# Patient Record
(demographics unavailable — no encounter records)

---

## 2024-11-06 NOTE — HISTORY OF PRESENT ILLNESS
[Chills] : no chills [Constipation] : no constipation [Diarrhea] : no diarrhea [Dysuria] : no dysuria [Fever] : no fever [Nausea] : no nausea [Vomiting] : no vomiting [Erythema] : not erythematous [Discharge] : absent of discharge [Dehiscence] : not dehisced [de-identified] : Patient presents today for F/U S/P Right TKR done 3 weeks ago. Patient is doing well and undergoing P.T. with improvement. Takes pain meds and DVT prophylaxis. I went over post-op care and answered all questions. I also provided patient with surgical report for their records. [de-identified] : ROM 0-90 degrees [de-identified] : Continue P.T. pain management and DVT prophylaxis. F/U in 1 month with x-rays.

## 2024-11-06 NOTE — PROCEDURE
[de-identified] : Observation on incision dry, clean, intact, well healed. Method staple removing kit. Suture site Cleaned with iodine swab after sutures are completely removed. Instructions Keep incision dry and clean, allowed to shower and pat site dry, do not rub dry, contact office is site becomes red, swollen, infected, or you develop a fever.

## 2024-12-04 NOTE — PHYSICAL EXAM
[de-identified] : General appearance: well-nourished and hydrated, pleasant, alert and oriented x 3, cooperative. HEENT: Normocephalic, EOM intact, Nasal septum midline, Oral cavity clear, External auditory canal clear. Cardiovascular: no apparent abnormalities, no lower leg edema, no varicosities, pedal pulses are palpable. Lymphatics Lymph nodes: none palpated, Lymphedema: not present. Neurologic: sensation is normal, no muscle weakness in upper or lower extremities, patella tendon reflexes intact. Dermatologic no apparent skin lesions, moist, warm, no rash. Spine: cervical spine appears normal and moves freely, thoracic spine appears normal and moves freely, lumbosacral spine appears normal and moves freely. Gait: nonantalgic.   Right Knee Inspection: mild soft tissue swelling  Wounds: healed midline incision  Alignment: normal. Palpation: no specific tenderness on palpation. ROM: Active (in degrees): 0-115 Ligamentous laxity (neg): negative ant. drawer test, negative post. drawer test, stable to varus stress test, stable to valgus stress test, Patellofemoral Alignment Test: Q angle-, normal. Muscle Test: good quad strength. Leg examination: calf is soft and non-tender.  [de-identified] : Right knee x-ray, AP, lateral, merchant view taken at the office today demonstrates a total knee replacement in satisfactory position and alignment. No evidence of loosening. The patella sits in a central position.  Left knee xray merchant view taken at the office today demonstrates good joint space and a well centered patella.

## 2024-12-04 NOTE — DISCUSSION/SUMMARY
[de-identified] : Patient is doing very well and making progress following their s/p Right TKR. I reviewed x-rays with them and compared to prior films. I have reassured them that their implants are functioning well. All questions answered, understanding verbalized.   He is encouraged to continue to stay active with a PT and home exercise program.   I will see them back in 6-8 weeks.

## 2024-12-04 NOTE — ADDENDUM
[FreeTextEntry1] : This note was written by Juany Schmid on 12/04/2024 acting as scribe for Dr. Yosi Rodriguez M.D.   I, Dr. Yosi Rodriguez, have read and attest that all the information, medical decision making and discharge instructions within are true and accurate.

## 2024-12-04 NOTE — HISTORY OF PRESENT ILLNESS
[de-identified] : ANA GUEVARA 69 year old male presents for evaluation of right TKR done 10/15/24. He reports doing well and continues PT 2x a week. He is not taking pain medications and ices the knee daily. He wants to return to the gym and walks with a cane outside the house.

## 2025-02-12 NOTE — PROCEDURE
[de-identified] : LEFT KNEE CORTISONE INJECTION Discussed at length with the patient the planned steroid and lidocaine injection. The risks, benefits, convalescence and alternatives were reviewed. The possible side effects discussed included but were not limited to: pain, swelling, heat and redness. These symptoms are generally mild but if they are extensive then contact the office. Giving pain relievers by mouth such as NSAIDs or Tylenol can generally treat the reactions to steroid and lidocaine. Rare cases of infection have been noted. Rash, hives and itching may occur post injection. If you have muscle pain or cramps, flushing and or swelling of the face, rapid heart beat, nausea, dizziness, fever, chills, headache, difficulty breathing, swelling in the arms or legs, or have a prickly feeling of your skin, contact a health care provider immediately.   Following this discussion, the knee was prepped with betadine and under sterile conditions 5 cc of 2% lidocaine and 1 cc depo-medrol (40mg) were injected with a 21 gauge needle. The needle was introduced into the joint, aspiration was performed to ensure intra-articular placement and the medication was injected. Upon withdrawal of the needle the site was cleaned with alcohol and a bandaid applied. The patient tolerated the injection well and there were no adverse effects. Post injection instructions included no strenuous activity for 24 hours, cryotherapy and if there are any adverse effects to contact the office.

## 2025-02-12 NOTE — REASON FOR VISIT
[Total Knee Replacement Surgery, Aftercare] : total knee replacement surgery, aftercare [Follow-Up Visit] : a follow-up visit for [Osteoarthritis, Knee] : osteoarthritis of the knee

## 2025-02-12 NOTE — HISTORY OF PRESENT ILLNESS
[de-identified] : ANA GUEVARA 69 year old male presents for evaluation of 4 months s/p right TKR. He doesn't feel 100% as he continues to have difficulty with stairs and tightness with flexion along the posterior aspect. He is able to walk 2 miles a day and does his own home exercises. He is traveling in May and would like a medical alert card. Regarding his left knee OA, he is having acute pain and would like cortisone injection.

## 2025-02-12 NOTE — DISCUSSION/SUMMARY
[de-identified] : Patient is doing well following their s/p Right TKR. I reviewed x-rays with them and compared to prior films. I have reassured them that their implants are functioning well. All questions answered, understanding verbalized. KOOS Jr scores obtained and reviewed. He is encouraged to continue to stay active with a home exercise program and activities as tolerated.  Regarding their left knee symptoms appear secondary to degenerative arthritis. We reviewed operative and nonoperative treatment. He knows he will eventually need a TKR, but he wants to avoid surgery at this time. Therefore, we will continue nonoperatively. In the interim, for the acute pain patient was given a left knee cortisone injection today as detailed above for symptomatic relief. I also suggested he continue with his home exercises.  They can continue activities as tolerated.   I will see them back in 3 months with x-rays of both knees, at that time we will schedule for a left TKR.

## 2025-02-12 NOTE — ADDENDUM
[FreeTextEntry1] : This note was written by Juany Schmid on 02/12/2025 acting as scribe for Dr. Yosi MASTERSON I, Dr. Yosi Rodriguez, have read and attest that all the information, medical decision making and discharge instructions within are true and accurate.   This note was written by José Miguel Carbajal on 02/12/2025 acting as scribe for Dr. Yosi MASTERSON I, Dr. Yosi Rodriguez, have read and attest that all the information, medical decision making and discharge instructions within are true and accurate.
English

## 2025-02-12 NOTE — PHYSICAL EXAM
[de-identified] : General appearance: well-nourished and hydrated, pleasant, alert and oriented x 3, cooperative. HEENT: Normocephalic, EOM intact, Nasal septum midline, Oral cavity clear, External auditory canal clear. Cardiovascular: no apparent abnormalities, no lower leg edema, no varicosities, pedal pulses are palpable. Lymphatics Lymph nodes: none palpated, Lymphedema: not present. Neurologic: sensation is normal, no muscle weakness in upper or lower extremities, patella tendon reflexes intact. Dermatologic no apparent skin lesions, moist, warm, no rash. Spine: cervical spine appears normal and moves freely, thoracic spine appears normal and moves freely, lumbosacral spine appears normal and moves freely. Gait: nonantalgic.  Right Knee Inspection: no effusion or erythema. Wounds: healed midline incision  Alignment: normal. Palpation: no specific tenderness on palpation. ROM: Active (in degrees): 0-115 with no instability. Ligamentous laxity (neg): negative ant. drawer test, negative post. drawer test, stable to varus stress test, stable to valgus stress test, Patellofemoral Alignment Test: Q angle-, normal. Muscle Test: good quad strength. Leg examination: calf is soft and non-tender.  Left Knee Inspection: trace effusion Wounds: none. Alignment: normal. Palpation: medial tenderness on palpation. ROM: Active (in degrees):0-125 with crepitus and discomfort Ligamentous laxity (neg): all ligaments appear stable, negative ant. drawer test, negative post. drawer test, stable to varus stress test, stable to valgus stress test, negative Lachman's test, negative pivot shift test, Meniscal Test: negative McMurrays, negative Vy. Patellofemoral Alignment Test: Q angle-, normal. Muscle Test: good quad strength. Leg examination: calf is soft and non-tender. [de-identified] : Right knee x-ray, AP, lateral, merchant view taken at the office today demonstrates a total knee replacement in satisfactory position and alignment. No evidence of loosening. The patella sits in a central position.  Left knee x-rays, standing AP/Lateral and Merchant films, and 45-degree PA standing view, taken at the office today shows diffuse tricompartmental degenerative arthritis, normal alignment, marginal osteophytes, medial bone on bone sclerosis, patella at appropriate height and central position, patellofemoral joint space narrowing, peripheral osteophytes, Kellgren and Shorty grade 4.

## 2025-03-19 NOTE — HISTORY OF PRESENT ILLNESS
[FreeTextEntry1] : pt is planning left knee surgery we discussed his hydroceles and the with potential symptoms and risk associated with observation and surgery

## 2025-05-14 NOTE — PROCEDURE
[de-identified] : LEFT KNEE CORTISONE INJECTION Discussed at length with the patient the planned steroid and lidocaine injection. The risks, benefits, convalescence and alternatives were reviewed. The possible side effects discussed included but were not limited to: pain, swelling, heat and redness. These symptoms are generally mild but if they are extensive then contact the office. Giving pain relievers by mouth such as NSAIDs or Tylenol can generally treat the reactions to steroid and lidocaine. Rare cases of infection have been noted. Rash, hives and itching may occur post injection. If you have muscle pain or cramps, flushing and or swelling of the face, rapid heart beat, nausea, dizziness, fever, chills, headache, difficulty breathing, swelling in the arms or legs, or have a prickly feeling of your skin, contact a health care provider immediately.   Following this discussion, the knee was prepped with betadine and under sterile conditions 5 cc of 2% lidocaine and 1 cc depo-medrol (40mg) were injected with a 21 gauge needle. The needle was introduced into the joint, aspiration was performed to ensure intra-articular placement and the medication was injected. Upon withdrawal of the needle the site was cleaned with alcohol and a bandaid applied. The patient tolerated the injection well and there were no adverse effects. Post injection instructions included no strenuous activity for 24 hours, cryotherapy and if there are any adverse effects to contact the office.

## 2025-05-14 NOTE — ADDENDUM
[FreeTextEntry1] : This note was written by José Miguel Carbajal on 05/14/2025 acting as scribe for Dr. Yosi Rodriguez M.D.   I, Dr. Yosi Rodriguez, have read and attest that all the information, medical decision making and discharge instructions within are true and accurate.

## 2025-05-14 NOTE — DISCUSSION/SUMMARY
[de-identified] : Patient is doing very well and is very happy following their s/p Right TKR. I reviewed x-rays with them and compared to prior films with no change from priors. I have reassured them that their implants are functioning well. All questions answered, understanding verbalized. KOOS Jr scores were obtained and reviewed.   Discussed at length the nature of the patients condition. Their left knee symptoms appear secondary to degenerative arthritis with varus deformity. While he knows he would eventually need a TKR, we will continue nonoperatively. For the acute pain patient was given a left knee cortisone injection today as detailed above for symptomatic relief. I also suggested home exercise, weight management, and continue with Tylenol prn. They can continue activities as tolerated. He is also undergoing workup for his gastritis and has an upcoming endoscopy.  I will see him back in 6 months with x-ray of both knees.

## 2025-05-14 NOTE — PHYSICAL EXAM
[de-identified] : General appearance: well-nourished and hydrated, pleasant, alert and oriented x 3, cooperative. HEENT: Normocephalic, EOM intact, Nasal septum midline, Oral cavity clear, External auditory canal clear. Cardiovascular: no apparent abnormalities, no lower leg edema, no varicosities, pedal pulses are palpable. Lymphatics Lymph nodes: none palpated, Lymphedema: not present. Neurologic: sensation is normal, no muscle weakness in upper or lower extremities, patella tendon reflexes intact. Dermatologic no apparent skin lesions, moist, warm, no rash. Spine: cervical spine appears normal and moves freely, thoracic spine appears normal and moves freely, lumbosacral spine appears normal and moves freely. Gait: nonantalgic.  Right Knee Inspection: no effusion or erythema. Wounds: healed midline incision  Alignment: normal. Palpation: no specific tenderness on palpation. ROM: Active (in degrees): 0-120 with no instability. Ligamentous laxity (neg): negative ant. drawer test, negative post. drawer test, stable to varus stress test, stable to valgus stress test, Patellofemoral Alignment Test: Q angle-, normal. Muscle Test: good quad strength. Leg examination: calf is soft and non-tender.  Left Knee Inspection: no effusion. Wounds: none. Alignment: 5 degrees varus alignment. Palpation: no specific tenderness on palpation. ROM: Active (in degrees):0-120 with crepitus  Ligamentous laxity (neg): all ligaments appear stable, negative ant. drawer test, negative post. drawer test, stable to varus stress test, stable to valgus stress test, negative Lachman's test, negative pivot shift test, Meniscal Test: negative McMurrays, negative Vy. Patellofemoral Alignment Test: Q angle-, normal. Muscle Test: good quad strength. Leg examination: calf is soft and non-tender. [de-identified] : Right knee x-ray, AP, lateral, merchant view taken at the office today demonstrates a total knee replacement in satisfactory position and alignment. No evidence of loosening. The patella sits in a central position.  Left knee x-rays, standing AP/Lateral and Merchant films, and 45-degree PA standing view, taken at the office today shows diffuse tricompartmental degenerative arthritis, varus deformity, marginal osteophytes, medial bone on bone sclerosis, patella at appropriate height and central position, patellofemoral joint space narrowing, peripheral osteophytes, Kellgren and Shorty grade 4.

## 2025-05-14 NOTE — HISTORY OF PRESENT ILLNESS
[de-identified] : ANA ISMAEL 70 year old male presents for follow-up evaluation of 6 month s/p right TKR which is doing well. He is going to the gym and does his home exercises. The pt is also getting back to his normal daily activities. For his left knee OA, he did have cortisone injection in February which did help and states that he is able to do his exercises but has some mild pain of his left knee.